# Patient Record
Sex: MALE | Race: BLACK OR AFRICAN AMERICAN | NOT HISPANIC OR LATINO | Employment: FULL TIME | URBAN - METROPOLITAN AREA
[De-identification: names, ages, dates, MRNs, and addresses within clinical notes are randomized per-mention and may not be internally consistent; named-entity substitution may affect disease eponyms.]

---

## 2018-11-26 ENCOUNTER — TRANSCRIBE ORDERS (OUTPATIENT)
Dept: ADMINISTRATIVE | Facility: HOSPITAL | Age: 43
End: 2018-11-26

## 2018-11-26 DIAGNOSIS — N46.11 OLIGOSPERMIA: Primary | ICD-10-CM

## 2018-11-28 ENCOUNTER — HOSPITAL ENCOUNTER (OUTPATIENT)
Dept: ULTRASOUND IMAGING | Facility: HOSPITAL | Age: 43
Discharge: HOME | End: 2018-11-28
Attending: OBSTETRICS & GYNECOLOGY
Payer: COMMERCIAL

## 2018-11-28 DIAGNOSIS — N46.11 OLIGOSPERMIA: ICD-10-CM

## 2018-11-28 PROCEDURE — 76870 US EXAM SCROTUM: CPT

## 2025-06-27 ENCOUNTER — TELEPHONE (OUTPATIENT)
Age: 50
End: 2025-06-27

## 2025-06-27 NOTE — TELEPHONE ENCOUNTER
Patient stated he will call the office back to scheduled with Dr. Lawson for a 2nd opinion. He would like to obtain his records from [facility] before scheduling.

## 2025-07-21 ENCOUNTER — TELEPHONE (OUTPATIENT)
Age: 50
End: 2025-07-21

## 2025-07-21 NOTE — TELEPHONE ENCOUNTER
Patient's wife called to schedule NP derm appt, no referral, offered next available and cancellation list. Did not take appt, Stated she would call back if needed.